# Patient Record
Sex: FEMALE | Race: BLACK OR AFRICAN AMERICAN | NOT HISPANIC OR LATINO
[De-identification: names, ages, dates, MRNs, and addresses within clinical notes are randomized per-mention and may not be internally consistent; named-entity substitution may affect disease eponyms.]

---

## 2017-01-03 ENCOUNTER — APPOINTMENT (OUTPATIENT)
Dept: GASTROENTEROLOGY | Facility: CLINIC | Age: 42
End: 2017-01-03

## 2017-01-23 ENCOUNTER — APPOINTMENT (OUTPATIENT)
Dept: SURGERY | Facility: CLINIC | Age: 42
End: 2017-01-23

## 2017-06-27 ENCOUNTER — APPOINTMENT (EMERGENCY)
Dept: RADIOLOGY | Facility: HOSPITAL | Age: 42
End: 2017-06-27
Payer: COMMERCIAL

## 2017-06-27 ENCOUNTER — HOSPITAL ENCOUNTER (EMERGENCY)
Facility: HOSPITAL | Age: 42
Discharge: HOME | End: 2017-06-27
Attending: EMERGENCY MEDICINE
Payer: COMMERCIAL

## 2017-06-27 VITALS
OXYGEN SATURATION: 100 % | HEIGHT: 61 IN | HEART RATE: 80 BPM | WEIGHT: 149 LBS | DIASTOLIC BLOOD PRESSURE: 68 MMHG | BODY MASS INDEX: 28.13 KG/M2 | SYSTOLIC BLOOD PRESSURE: 111 MMHG | RESPIRATION RATE: 18 BRPM | TEMPERATURE: 98.3 F

## 2017-06-27 DIAGNOSIS — S91.209A AVULSION OF TOENAIL OF LEFT FOOT: Primary | ICD-10-CM

## 2017-06-27 DIAGNOSIS — S93.505A: ICD-10-CM

## 2017-06-27 PROCEDURE — 99284 EMERGENCY DEPT VISIT MOD MDM: CPT

## 2017-06-27 PROCEDURE — 73630 X-RAY EXAM OF FOOT: CPT

## 2017-06-27 RX ORDER — TRAMADOL HYDROCHLORIDE 50 MG/1
50 TABLET ORAL EVERY 6 HOURS PRN
Qty: 20 TABLET | Refills: 0 | Status: SHIPPED | OUTPATIENT
Start: 2017-06-27 | End: 2017-07-07

## 2017-06-27 RX ORDER — TRAMADOL HYDROCHLORIDE 50 MG/1
50 TABLET ORAL ONCE
Status: COMPLETED | OUTPATIENT
Start: 2017-06-27 | End: 2017-06-27

## 2017-06-27 RX ADMIN — TRAMADOL HYDROCHLORIDE 50 MG: 50 TABLET, COATED ORAL at 15:56

## 2023-05-21 ENCOUNTER — APPOINTMENT (EMERGENCY)
Dept: CT IMAGING | Facility: HOSPITAL | Age: 48
End: 2023-05-21

## 2023-05-21 ENCOUNTER — HOSPITAL ENCOUNTER (EMERGENCY)
Facility: HOSPITAL | Age: 48
Discharge: HOME | End: 2023-05-21
Attending: EMERGENCY MEDICINE

## 2023-05-21 VITALS
DIASTOLIC BLOOD PRESSURE: 100 MMHG | HEIGHT: 63 IN | RESPIRATION RATE: 18 BRPM | SYSTOLIC BLOOD PRESSURE: 168 MMHG | TEMPERATURE: 98.4 F | BODY MASS INDEX: 20.38 KG/M2 | OXYGEN SATURATION: 100 % | WEIGHT: 115 LBS | HEART RATE: 88 BPM

## 2023-05-21 DIAGNOSIS — R11.2 NAUSEA AND VOMITING: ICD-10-CM

## 2023-05-21 DIAGNOSIS — R10.84 GENERALIZED ABDOMINAL PAIN: ICD-10-CM

## 2023-05-21 DIAGNOSIS — R19.7 DIARRHEA: ICD-10-CM

## 2023-05-21 DIAGNOSIS — K51.90 ULCERATIVE COLITIS (HCC): ICD-10-CM

## 2023-05-21 DIAGNOSIS — K52.9 COLITIS: Primary | ICD-10-CM

## 2023-05-21 LAB
ALBUMIN SERPL BCP-MCNC: 2.9 G/DL (ref 3.5–5)
ALP SERPL-CCNC: 121 U/L (ref 34–104)
ALT SERPL W P-5'-P-CCNC: 40 U/L (ref 7–52)
ANION GAP SERPL CALCULATED.3IONS-SCNC: 4 MMOL/L (ref 4–13)
AST SERPL W P-5'-P-CCNC: 43 U/L (ref 13–39)
BASOPHILS # BLD AUTO: 0.06 THOUSANDS/ÂΜL (ref 0–0.1)
BASOPHILS NFR BLD AUTO: 1 % (ref 0–1)
BILIRUB SERPL-MCNC: 0.44 MG/DL (ref 0.2–1)
BILIRUB UR QL STRIP: NEGATIVE
BUN SERPL-MCNC: 14 MG/DL (ref 5–25)
CALCIUM ALBUM COR SERPL-MCNC: 9.2 MG/DL (ref 8.3–10.1)
CALCIUM SERPL-MCNC: 8.3 MG/DL (ref 8.4–10.2)
CHLORIDE SERPL-SCNC: 108 MMOL/L (ref 96–108)
CLARITY UR: CLEAR
CO2 SERPL-SCNC: 29 MMOL/L (ref 21–32)
COLOR UR: NORMAL
CREAT SERPL-MCNC: 0.67 MG/DL (ref 0.6–1.3)
EOSINOPHIL # BLD AUTO: 0.23 THOUSAND/ÂΜL (ref 0–0.61)
EOSINOPHIL NFR BLD AUTO: 4 % (ref 0–6)
ERYTHROCYTE [DISTWIDTH] IN BLOOD BY AUTOMATED COUNT: 17.7 % (ref 11.6–15.1)
EXT PREGNANCY TEST URINE: NEGATIVE
EXT. CONTROL: NORMAL
GFR SERPL CREATININE-BSD FRML MDRD: 105 ML/MIN/1.73SQ M
GLUCOSE SERPL-MCNC: 84 MG/DL (ref 65–140)
GLUCOSE UR STRIP-MCNC: NEGATIVE MG/DL
HCG SERPL QL: NEGATIVE
HCT VFR BLD AUTO: 30.2 % (ref 34.8–46.1)
HGB BLD-MCNC: 10.3 G/DL (ref 11.5–15.4)
HGB UR QL STRIP.AUTO: NEGATIVE
IMM GRANULOCYTES # BLD AUTO: 0.01 THOUSAND/UL (ref 0–0.2)
IMM GRANULOCYTES NFR BLD AUTO: 0 % (ref 0–2)
KETONES UR STRIP-MCNC: NEGATIVE MG/DL
LEUKOCYTE ESTERASE UR QL STRIP: NEGATIVE
LIPASE SERPL-CCNC: 10 U/L (ref 11–82)
LYMPHOCYTES # BLD AUTO: 2.96 THOUSANDS/ÂΜL (ref 0.6–4.47)
LYMPHOCYTES NFR BLD AUTO: 47 % (ref 14–44)
MCH RBC QN AUTO: 29.4 PG (ref 26.8–34.3)
MCHC RBC AUTO-ENTMCNC: 34.1 G/DL (ref 31.4–37.4)
MCV RBC AUTO: 86 FL (ref 82–98)
MONOCYTES # BLD AUTO: 0.46 THOUSAND/ÂΜL (ref 0.17–1.22)
MONOCYTES NFR BLD AUTO: 7 % (ref 4–12)
NEUTROPHILS # BLD AUTO: 2.59 THOUSANDS/ÂΜL (ref 1.85–7.62)
NEUTS SEG NFR BLD AUTO: 41 % (ref 43–75)
NITRITE UR QL STRIP: NEGATIVE
NRBC BLD AUTO-RTO: 0 /100 WBCS
PH UR STRIP.AUTO: 7.5 [PH]
PLATELET # BLD AUTO: 274 THOUSANDS/UL (ref 149–390)
PMV BLD AUTO: 10.6 FL (ref 8.9–12.7)
POTASSIUM SERPL-SCNC: 4.5 MMOL/L (ref 3.5–5.3)
PROT SERPL-MCNC: 5.7 G/DL (ref 6.4–8.4)
PROT UR STRIP-MCNC: NEGATIVE MG/DL
RBC # BLD AUTO: 3.5 MILLION/UL (ref 3.81–5.12)
SODIUM SERPL-SCNC: 141 MMOL/L (ref 135–147)
SP GR UR STRIP.AUTO: 1.02 (ref 1–1.03)
UROBILINOGEN UR STRIP-ACNC: <2 MG/DL
WBC # BLD AUTO: 6.31 THOUSAND/UL (ref 4.31–10.16)

## 2023-05-21 RX ORDER — HYDROMORPHONE HCL/PF 1 MG/ML
1 SYRINGE (ML) INJECTION ONCE
Status: COMPLETED | OUTPATIENT
Start: 2023-05-21 | End: 2023-05-21

## 2023-05-21 RX ORDER — ONDANSETRON 4 MG/1
4 TABLET, FILM COATED ORAL EVERY 6 HOURS PRN
Qty: 12 TABLET | Refills: 0 | Status: SHIPPED | OUTPATIENT
Start: 2023-05-21

## 2023-05-21 RX ORDER — PREDNISONE 20 MG/1
TABLET ORAL
Qty: 35 TABLET | Refills: 0 | Status: SHIPPED | OUTPATIENT
Start: 2023-05-21 | End: 2023-06-18

## 2023-05-21 RX ORDER — METHYLPREDNISOLONE SODIUM SUCCINATE 40 MG/ML
40 INJECTION, POWDER, LYOPHILIZED, FOR SOLUTION INTRAMUSCULAR; INTRAVENOUS ONCE
Status: COMPLETED | OUTPATIENT
Start: 2023-05-21 | End: 2023-05-21

## 2023-05-21 RX ORDER — OXYCODONE HYDROCHLORIDE 5 MG/1
5 TABLET ORAL EVERY 6 HOURS PRN
Qty: 15 TABLET | Refills: 0 | Status: SHIPPED | OUTPATIENT
Start: 2023-05-21 | End: 2023-05-31

## 2023-05-21 RX ORDER — ONDANSETRON 2 MG/ML
4 INJECTION INTRAMUSCULAR; INTRAVENOUS ONCE
Status: COMPLETED | OUTPATIENT
Start: 2023-05-21 | End: 2023-05-21

## 2023-05-21 RX ADMIN — HYDROMORPHONE HYDROCHLORIDE 1 MG: 1 INJECTION, SOLUTION INTRAMUSCULAR; INTRAVENOUS; SUBCUTANEOUS at 11:28

## 2023-05-21 RX ADMIN — HYDROMORPHONE HYDROCHLORIDE 1 MG: 1 INJECTION, SOLUTION INTRAMUSCULAR; INTRAVENOUS; SUBCUTANEOUS at 12:55

## 2023-05-21 RX ADMIN — METHYLPREDNISOLONE SODIUM SUCCINATE 40 MG: 40 INJECTION, POWDER, FOR SOLUTION INTRAMUSCULAR; INTRAVENOUS at 14:45

## 2023-05-21 RX ADMIN — HYDROMORPHONE HYDROCHLORIDE 1 MG: 1 INJECTION, SOLUTION INTRAMUSCULAR; INTRAVENOUS; SUBCUTANEOUS at 14:43

## 2023-05-21 RX ADMIN — ONDANSETRON 4 MG: 2 INJECTION INTRAMUSCULAR; INTRAVENOUS at 11:28

## 2023-05-21 RX ADMIN — IOHEXOL 100 ML: 350 INJECTION, SOLUTION INTRAVENOUS at 13:40

## 2023-05-21 RX ADMIN — SODIUM CHLORIDE 1000 ML: 0.9 INJECTION, SOLUTION INTRAVENOUS at 11:25

## 2023-05-21 NOTE — ED PROVIDER NOTES
"History  Chief Complaint   Patient presents with   • Abdominal Pain     Patient c/o \"RUQ pain x Thursday along with diarrhea, nausea, vomiting, hx of ulcerative colitis\"     40-year-old female history of ulcerative colitis and [place]-en-Y in 2005 with previous surgeries including cholecystectomy, appendectomy, colon resection for bowel obstruction presenting with abdominal pain  Patient reports diffuse generalized sharp abdominal discomfort worsening over the last few days associated with nonbloody diarrhea and nausea and several episodes of nonbloody nonbilious emesis  Denies any chest pain shortness of breath  Difficulty with p o  tolerance  Patient reports feels similar to episodes of ulcerative colitis flares  Denies any other complaints  Chart reviewed  Past Medical History:  No date: Colitis  Family History: non-contributory  Social History            Prior to Admission Medications   Prescriptions Last Dose Informant Patient Reported? Taking? Calcium Carbonate (CALTRATE 600 PO)   Yes No   Sig: Take by mouth   QUEtiapine (SEROquel) 100 mg tablet   Yes No   Sig: Take 100 mg by mouth daily at bedtime   QUEtiapine (SEROquel) 50 mg tablet   Yes No   Sig: Take 50 mg by mouth daily   multivitamin (THERAGRAN) TABS   Yes No   Sig: Take 1 tablet by mouth daily      Facility-Administered Medications: None       Past Medical History:   Diagnosis Date   • Colitis        Past Surgical History:   Procedure Laterality Date   • APPENDECTOMY      1987   • CHOLECYSTECTOMY     • GASTRIC BYPASS      2004       No family history on file  I have reviewed and agree with the history as documented  E-Cigarette/Vaping     E-Cigarette/Vaping Substances     Social History     Tobacco Use   • Smoking status: Every Day     Packs/day: 0 50     Types: Cigarettes   Substance Use Topics   • Alcohol use:  Yes     Alcohol/week: 20 0 standard drinks     Types: 20 Glasses of wine per week   • Drug use: No       Review of Systems " Constitutional: Negative for appetite change, chills, diaphoresis, fever and unexpected weight change  HENT: Negative for congestion and rhinorrhea  Eyes: Negative for photophobia and visual disturbance  Respiratory: Negative for cough, chest tightness and shortness of breath  Cardiovascular: Negative for chest pain, palpitations and leg swelling  Gastrointestinal: Positive for abdominal pain, diarrhea, nausea and vomiting  Negative for abdominal distention, blood in stool and constipation  Genitourinary: Negative for dysuria and hematuria  Musculoskeletal: Negative for back pain, joint swelling, neck pain and neck stiffness  Skin: Negative for color change, pallor, rash and wound  Neurological: Negative for dizziness, syncope, weakness, light-headedness and headaches  Psychiatric/Behavioral: Negative for agitation  All other systems reviewed and are negative  Physical Exam  Physical Exam  Vitals and nursing note reviewed  Constitutional:       General: She is not in acute distress  Appearance: Normal appearance  She is ill-appearing  She is not toxic-appearing or diaphoretic  HENT:      Head: Normocephalic and atraumatic  Nose: Nose normal  No congestion or rhinorrhea  Mouth/Throat:      Mouth: Mucous membranes are moist       Pharynx: Oropharynx is clear  No oropharyngeal exudate or posterior oropharyngeal erythema  Eyes:      General: No scleral icterus  Right eye: No discharge  Left eye: No discharge  Extraocular Movements: Extraocular movements intact  Conjunctiva/sclera: Conjunctivae normal       Pupils: Pupils are equal, round, and reactive to light  Neck:      Vascular: No JVD  Trachea: No tracheal deviation  Comments: Supple  Normal range of motion  Cardiovascular:      Rate and Rhythm: Normal rate and regular rhythm  Heart sounds: Normal heart sounds  No murmur heard  No friction rub  No gallop        Comments: Normal rate and regular rhythm  Pulmonary:      Effort: Pulmonary effort is normal  No respiratory distress  Breath sounds: Normal breath sounds  No stridor  No wheezing or rales  Comments: Clear to auscultation bilaterally  Chest:      Chest wall: No tenderness  Abdominal:      General: Bowel sounds are normal  There is no distension  Palpations: Abdomen is soft  Tenderness: There is generalized abdominal tenderness  There is guarding  There is no right CVA tenderness, left CVA tenderness or rebound  Comments: Generalized abdominal tenderness and guarding throughout  Otherwise soft and nondistended  Normal bowel sounds throughout   Musculoskeletal:         General: No swelling, tenderness, deformity or signs of injury  Normal range of motion  Cervical back: Normal range of motion and neck supple  No rigidity  No muscular tenderness  Right lower leg: No edema  Left lower leg: No edema  Lymphadenopathy:      Cervical: No cervical adenopathy  Skin:     General: Skin is warm and dry  Coloration: Skin is not pale  Findings: No erythema or rash  Neurological:      General: No focal deficit present  Mental Status: She is alert  Mental status is at baseline  Sensory: No sensory deficit  Motor: No weakness or abnormal muscle tone  Coordination: Coordination normal       Gait: Gait normal       Comments: Alert  Strength and sensation grossly intact  Ambulatory without difficulty at baseline  Psychiatric:         Behavior: Behavior normal          Thought Content:  Thought content normal          Vital Signs  ED Triage Vitals   Temperature Pulse Respirations Blood Pressure SpO2   05/21/23 1008 05/21/23 1008 05/21/23 1008 05/21/23 1008 05/21/23 1008   98 4 °F (36 9 °C) (!) 111 18 168/100 98 %      Temp Source Heart Rate Source Patient Position - Orthostatic VS BP Location FiO2 (%)   05/21/23 1008 05/21/23 1008 05/21/23 1008 05/21/23 1008 -- Oral Monitor Sitting Left arm       Pain Score       05/21/23 1128       10 - Worst Possible Pain           Vitals:    05/21/23 1008 05/21/23 1245   BP: 168/100    Pulse: (!) 111 88   Patient Position - Orthostatic VS: Sitting          Visual Acuity      ED Medications  Medications   sodium chloride 0 9 % bolus 1,000 mL (0 mL Intravenous Stopped 5/21/23 1225)   HYDROmorphone (DILAUDID) injection 1 mg (1 mg Intravenous Given 5/21/23 1128)   ondansetron (ZOFRAN) injection 4 mg (4 mg Intravenous Given 5/21/23 1128)   HYDROmorphone (DILAUDID) injection 1 mg (1 mg Intravenous Given 5/21/23 1255)   iohexol (OMNIPAQUE) 350 MG/ML injection (MULTI-DOSE) 100 mL (100 mL Intravenous Given 5/21/23 1340)   iohexol (OMNIPAQUE) 240 MG/ML solution 50 mL (50 mL Oral Given 5/21/23 1343)   HYDROmorphone (DILAUDID) injection 1 mg (1 mg Intravenous Given 5/21/23 1443)   methylPREDNISolone sodium succinate (Solu-MEDROL) injection 40 mg (40 mg Intravenous Given 5/21/23 1445)       Diagnostic Studies  Results Reviewed     Procedure Component Value Units Date/Time    POCT pregnancy, urine [[id number]]  (Normal) Resulted: 05/21/23 1329    Lab Status: Final result Updated: 05/21/23 1329     EXT Preg Test, Ur Negative     Control Valid    hCG, qualitative pregnancy [[id number]]  (Normal) Collected: 05/21/23 1131    Lab Status: Final result Specimen: Blood from Arm, Left Updated: 05/21/23 1314     Preg, Serum Negative    UA w Reflex to Microscopic w Reflex to Culture [[id number]] Collected: 05/21/23 1222    Lab Status: Final result Specimen: Urine, Clean Catch Updated: 05/21/23 1231     Color, UA Light Yellow     Clarity, UA Clear     Specific Gravity, UA 1 019     pH, UA 7 5     Leukocytes, UA Negative     Nitrite, UA Negative     Protein, UA Negative mg/dl      Glucose, UA Negative mg/dl      Ketones, UA Negative mg/dl      Urobilinogen, UA <2 0 mg/dl      Bilirubin, UA Negative     Occult Blood, UA Negative    Comprehensive metabolic panel [[id number]]  (Abnormal) Collected: 05/21/23 1131    Lab Status: Final result Specimen: Blood from Arm, Left Updated: 05/21/23 1201     Sodium 141 mmol/L      Potassium 4 5 mmol/L      Chloride 108 mmol/L      CO2 29 mmol/L      ANION GAP 4 mmol/L      BUN 14 mg/dL      Creatinine 0 67 mg/dL      Glucose 84 mg/dL      Calcium 8 3 mg/dL      Corrected Calcium 9 2 mg/dL      AST 43 U/L      ALT 40 U/L      Alkaline Phosphatase 121 U/L      Total Protein 5 7 g/dL      Albumin 2 9 g/dL      Total Bilirubin 0 44 mg/dL      eGFR 105 ml/min/1 73sq m     Narrative:      National Kid[employer] Disease Foundation guidelines for Chronic Kidney Disease (CKD):   •  Stage 1 with normal or high GFR (GFR > 90 mL/min/1 73 square meters)  •  Stage 2 Mild CKD (GFR = 60-89 mL/min/1 73 square meters)  •  Stage 3A Moderate CKD (GFR = 45-59 mL/min/1 73 square meters)  •  Stage 3B Moderate CKD (GFR = 30-44 mL/min/1 73 square meters)  •  Stage 4 Severe CKD (GFR = 15-29 mL/min/1 73 square meters)  •  Stage 5 End Stage CKD (GFR <15 mL/min/1 73 square meters)  Note: GFR calculation is accurate only with a steady state creatinine    Lipase [[id number]]  (Abnormal) Collected: 05/21/23 1131    Lab Status: Final result Specimen: Blood from Arm, Left Updated: 05/21/23 1201     Lipase 10 u/L     CBC and differential [[id number]]  (Abnormal) Collected: 05/21/23 1131    Lab Status: Final result Specimen: Blood from Arm, Left Updated: 05/21/23 1141     WBC 6 31 Thousand/uL      RBC 3 50 Million/uL      Hemoglobin 10 3 g/dL      Hematocrit 30 2 %      MCV 86 fL      MCH 29 4 pg      MCHC 34 1 g/dL      RDW 17 7 %      MPV 10 6 fL      Platelets 935 Thousands/uL      nRBC 0 /100 WBCs      Neutrophils Relative 41 %      Immat GRANS % 0 %      Lymphocytes Relative 47 %      Monocytes Relative 7 %      Eosinophils Relative 4 %      Basophils Relative 1 %      Neutrophils Absolute 2 59 Thousands/µL      Immature Grans Absolute 0 01 Thousand/uL      Lymphocytes Absolute 2 96 Thousands/µL      Monocytes Absolute 0 46 Thousand/µL      Eosinophils Absolute 0 23 Thousand/µL      Basophils Absolute 0 06 Thousands/µL                  CT abdomen pelvis with contrast   Final Result by Kulwant Gongora MD (05/21 4495)      Mural thickening of multiple segments of the colon likely represents colitis  No abdominopelvic abscess  Workstation performed: [id number]                    Procedures  Procedures         ED Course                                             Medical Decision Making  51-year-old female history of ulcerative colitis and [place]-en-Y in 2005 with previous surgeries including cholecystectomy, appendectomy, colon resection for bowel obstruction presenting with abdominal pain  Generalized abdominal tenderness with guarding and nausea vomiting diarrhea  Patient with multiple previous surgeries  Concerning for possible intra-abdominal process such as ulcerative colitis or obstruction  Plan for labs including abdominal labs  CT imaging with oral and IV contrast   Symptom management with oral and IV medications plus IV fluids  UA and pregnancy testing  Reassess  Labs interpreted by me and no significant acute process  Patient still with pain and given additional IV pain medication with further improvement  Imaging notable for thickening of intestine consistent with colitis  Concern for possible ulcerative colitis flare  Patient reports good response to previous steroids  Tolerating p o  Plan for outpatient management  Given dose of IV steroids and prescription sent to the pharmacy  Sent additional prescriptions for pain and nausea medication to the pharmacy  [place] but provided local GI information  Discussed results and recommendations  Advised follow up PCP and GI  Medication recommendations  Given instructions and return precautions   Patient/family at bedside acknowledged understanding of all written and verbal instructions and return precautions  Discharged  Amount and/or Complexity of Data Reviewed  Labs: ordered  Radiology: ordered  Risk  Prescription drug management  Disposition  Final diagnoses:   Generalized abdominal pain   Nausea and vomiting   Diarrhea   Colitis   Ulcerative colitis ([address] )     Time reflects when diagnosis was documented in both MDM as applicable and the Disposition within this note     Time User Action Codes Description Comment    5/21/2023 11:03 AM Waymond Pies Add [R10 84] Generalized abdominal pain     5/21/2023 11:03 AM Waymond Pies Add [R11 2] Nausea and vomiting     5/21/2023 11:03 AM Waymond Pies Add [R19 7] Diarrhea     5/21/2023  2:25 PM Waymond Pies Add [K52 9] Colitis     5/21/2023  2:25 PM Waymond Pies Modify [R10 84] Generalized abdominal pain     5/21/2023  2:25 PM Waymond Pies Modify [K52 9] Colitis     5/21/2023  2:25 PM Waymond Pies Add [K51 90] Ulcerative colitis [facility])       ED Disposition     ED Disposition   Discharge    Condition   Stable    Date/Time   Sun May 21, 2023  2:31 PM    Comment   Fartun Arellano discharge to home/self care                 Follow-up Information     Follow up With Specialties Details Why Contact Info Additional Information    Jennifer Morejon MD Internal Medicine Schedule an appointment as soon as possible for a visit in 1 week  [address] [facility] [address]       [facility] Schedule an appointment as soon as possible for a visit in 1 week  [address] [address]  [address] [facility] [place], [address] [facility], [facility] [place], [place], [address]           Discharge Medication List as of 5/21/2023  2:31 PM      START taking these medications    Details   ondansetron (ZOFRAN) 4 mg tablet Take 1 tablet (4 mg total) by mouth every 6 (six) hours as needed for nausea or vomiting, Starting Sun 5/21/2023, Normal      oxyCODONE (Roxicodone) 5 immediate release tablet Take 1 tablet (5 mg total) by mouth every 6 (six) hours as needed for severe pain for up to 10 days Max Daily Amount: 20 mg, Starting Sun 5/21/2023, Until Wed 5/31/2023 at 2359, Normal      predniSONE 20 mg tablet Multiple Dosages:Starting Sun 5/21/2023, Until Sat 5/27/2023 at 2359, THEN Starting Sun 5/28/2023, Until Sat 6/3/2023 at 2359, THEN Starting Sun 6/4/2023, Until Sat 6/10/2023 at 2359, THEN Starting Sun 6/11/2023, Until Sat 6/17/2023 at 2359Take 2 ta blets (40 mg total) by mouth daily for 7 days, THEN 1 5 tablets (30 mg total) daily for 7 days, THEN 1 tablet (20 mg total) daily for 7 days, THEN 0 5 tablets (10 mg total) daily for 7 days  , Normal         CONTINUE these medications which have NOT CHANGED    Details   Calcium Carbonate (CALTRATE 600 PO) Take by mouth, Until Discontinued, Historical Med      multivitamin (THERAGRAN) TABS Take 1 tablet by mouth daily, Until Discontinued, Historical Med      !! QUEtiapine (SEROquel) 100 mg tablet Take 100 mg by mouth daily at bedtime, Until Discontinued, Historical Med      !! QUEtiapine (SEROquel) 50 mg tablet Take 50 mg by mouth daily, Until Discontinued, Historical Med       !! - Potential duplicate medications found  Please discuss with provider  No discharge procedures on file      PDMP Review       Value Time User    PDMP Reviewed  Yes 5/21/2023 12:35 PM Desiree Sexton MD          ED Provider  Electronically Signed by           Desiree Sexton MD  05/21/23 7332

## 2023-05-21 NOTE — DISCHARGE INSTRUCTIONS
Please follow up PCP and GI  Recommend tylenol 650 mg and ibuprofen 600 mg every 6 hours as needed for pain  Please return for severe chest pain, significant shortness of breath, severely worsening symptoms, or any other concerning signs or symptoms  Please refer to the following documents for additional instructions and return precautions